# Patient Record
Sex: MALE | Race: BLACK OR AFRICAN AMERICAN | Employment: PART TIME | URBAN - METROPOLITAN AREA
[De-identification: names, ages, dates, MRNs, and addresses within clinical notes are randomized per-mention and may not be internally consistent; named-entity substitution may affect disease eponyms.]

---
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
## 2019-04-12 ENCOUNTER — HOSPITAL ENCOUNTER (EMERGENCY)
Age: 19
Discharge: HOME | End: 2019-04-12
Attending: EMERGENCY MEDICINE
Payer: COMMERCIAL

## 2019-04-12 VITALS
TEMPERATURE: 97.3 F | OXYGEN SATURATION: 99 % | RESPIRATION RATE: 18 BRPM | SYSTOLIC BLOOD PRESSURE: 123 MMHG | DIASTOLIC BLOOD PRESSURE: 69 MMHG | HEART RATE: 69 BPM

## 2019-04-12 DIAGNOSIS — S05.01XA ABRASION OF RIGHT CORNEA, INITIAL ENCOUNTER: ICD-10-CM

## 2019-04-12 DIAGNOSIS — T26.91XA CHEMICAL INJURY OF EYE, RIGHT, INITIAL ENCOUNTER: Primary | ICD-10-CM

## 2019-04-12 PROCEDURE — 6370000000 HC RX 637 (ALT 250 FOR IP): Performed by: STUDENT IN AN ORGANIZED HEALTH CARE EDUCATION/TRAINING PROGRAM

## 2019-04-12 PROCEDURE — 99283 EMERGENCY DEPT VISIT LOW MDM: CPT

## 2019-04-12 RX ORDER — PROPARACAINE HYDROCHLORIDE 5 MG/ML
1 SOLUTION/ DROPS OPHTHALMIC ONCE
Status: COMPLETED | OUTPATIENT
Start: 2019-04-12 | End: 2019-04-12

## 2019-04-12 RX ORDER — ERYTHROMYCIN 5 MG/G
OINTMENT OPHTHALMIC
Qty: 1 TUBE | Refills: 0 | Status: SHIPPED | OUTPATIENT
Start: 2019-04-12 | End: 2019-04-22

## 2019-04-12 RX ORDER — ERYTHROMYCIN 5 MG/G
OINTMENT OPHTHALMIC ONCE
Status: COMPLETED | OUTPATIENT
Start: 2019-04-12 | End: 2019-04-12

## 2019-04-12 RX ADMIN — FLUORESCEIN SODIUM 1 MG: 1 STRIP OPHTHALMIC at 04:15

## 2019-04-12 RX ADMIN — PROPARACAINE HYDROCHLORIDE 1 DROP: 5 SOLUTION/ DROPS OPHTHALMIC at 04:15

## 2019-04-12 RX ADMIN — ERYTHROMYCIN: 5 OINTMENT OPHTHALMIC at 04:49

## 2019-04-12 ASSESSMENT — ENCOUNTER SYMPTOMS
SHORTNESS OF BREATH: 0
EYE PAIN: 1
ABDOMINAL PAIN: 0
VOMITING: 0
NAUSEA: 0

## 2019-04-12 NOTE — ED NOTES
Pt to ED with c/o right eye irritation, states he was at work when he splashed chemical  in his eye. Pt states he then rinsed his eye prior to arrival. Pt denies any pain or vision problems, instructed to rinse eye in eye wash station for 10 minutes. Pt triaged VSS.      Deanne Subramanian RN  04/12/19 9943

## 2019-04-12 NOTE — ED PROVIDER NOTES
[facility] ED  Emergency Department Encounter  Emergency Medicine Resident     Pt Name: Terri Morfin  MRN: [id number]  [place] 2000  Date ofevaluation: 4/12/19  PCP:  Mary Lou Zabala MD    CHIEF COMPLAINT       Chief Complaint   Patient presents with    Eye Problem     pt splashed  in his right eye while at work      HISTORY OF PRESENT ILLNESS  (Location/Symptom, Timing/Onset, Context/Setting, Quality, Duration, Modifying Factors, Severity.)      Patient examined by myself and medical student. What follows is the medical student's HPI with my pertinent additions and subtractions. Terri Morfin is a 19 y.o. male who presents with complaint of right eye irritation after \"greaselift RTU\" cleaning product splashed in his eye while at work 30 minutes prior. He irrigated his face and eyes thoroughly at work and was then driven here and irrigated his eye further. Reports mild irritation and burning sensation when he blinks. Denies pain with eye movement, denies vision changes. Denies increased light sensitivity. Denies shortness of breath, throat pain, chest pain. Denies any other medical problems or daily medications. Does not wear contacts. REVIEW OF SYSTEMS    (2-9 systems for level 4, 10 or more for level 5)      Review of Systems   Constitutional: Negative for chills and fever. Eyes: Positive for pain. Respiratory: Negative for shortness of breath. Cardiovascular: Negative for chest pain. Gastrointestinal: Negative for abdominal pain, nausea and vomiting. Musculoskeletal: Negative for gait problem. Skin: Negative for rash. Neurological: Negative for headaches. Psychiatric/Behavioral: Negative for confusion. PAST MEDICAL / SURGICAL /SOCIAL / FAMILY HISTORY      has a past medical history of ADHD (attention deficit hyperactivity disorder). has a past surgical history that includes Ankle Closed Reduction (10/30/13).       Social strip 1 mg    proparacaine (ALCAINE) 0.5 % ophthalmic solution 1 drop    erythromycin (ROMYCIN) ophthalmic ointment    erythromycin (ROMYCIN) 5 MG/GM ophthalmic ointment     Sig: Apply 4 drops to right eye for 5 days     Dispense:  1 Tube     Refill:  0       DIAGNOSTIC RESULTS     LABS:  No results found for this visit on 04/12/19. Labs Reviewed - No data to display    ED BEDSIDE ULTRASOUND:   Not indicated    [address] [facility]   20 y/o M here after chemical injury to eye, accidentally splashed [place] greaselift RTU in his R eye; irrigated eye copiously at work and instructed to irrigate eye here upon arrival. Reports mild irritation to the eye at this time. No vision changes. On exam patient has mild scleral erythema, noted to have increased fluorescein uptake at the 3 oclock position consistent w/ corneal abrasion. No concerns for globe rupture. MSDS for the chemical was looked up, recommending copious irrigation in cases of eye contact which was done. Will provide patient with romycin ointment here and send home with the tube. Will provide with a Rx. Patient seen by occupational health, to follow up with them for reevaluation. Discussed return precautions. Patient discharged in stable condition. PROCEDURES:  Procedures    CONSULTS:  None    CRITICAL CARE:  See attending note    FINAL IMPRESSION      1. Chemical injury of eye, right, initial encounter    2. Abrasion of right cornea, initial encounter        DISPOSITION / PLAN     DISPOSITION Decision To Discharge 04/12/2019 05:03:59 AM    If discharged, the patient was instructed to return to the emergency department with any worsening symptoms, if new symptoms arise, or if they have any other concerns. Patient was instructed not to drive home if discharged today and received pain medications or other mind-altering medications while here.     PATIENT REFERRED TO:  Taye Dawson, [id number]  [phone]    Schedule an appointment as soon as possible for a visit       [facility] ED  [address] [facility] [address]  [phone]    As needed, If symptoms worsen    Occupational health    Go to         DISCHARGE MEDICATIONS:  Discharge Medication List as of 4/12/2019  4:52 AM      START taking these medications    Details   erythromycin (ROMYCIN) 5 MG/GM ophthalmic ointment Apply 4 drops to right eye for 5 days, Disp-1 Tube, R-0, Print           Hien Magallanes, DO  Emergency Medicine Resident    (Please note that portions of this note were completed with a voice recognition program.  Ran Lies made to edit the dictations but occasionally words are mis-transcribed.)      Hien Magallanes, DO  04/12/19 [id number]

## 2019-04-12 NOTE — ED PROVIDER NOTES
[address] [facility]     Emergency Department     Faculty Attestation    I performed a history and physical examination of the patient and discussed management with the resident. I reviewed the residents note and agree with the documented findings including all diagnostic interpretations and plan of care. Any areas of disagreement are noted on the chart. I was personally present for the key portions of any procedures. I have documented in the chart those procedures where I was not present during the key portions. I have reviewed the emergency nurses triage note. I agree with the chief complaint, past medical history, past surgical history, allergies, medications, social and family history as documented unless otherwise noted below. Documentation of the HPI, Physical Exam and Medical Decision Making performed by scribdoris is based on my personal performance of the HPI, PE and MDM. For Physician Assistant/ Nurse Practitioner cases/documentation I have personally evaluated this patient and have completed at least one if not all key elements of the E/M (history, physical exam, and MDM). Additional findings are as noted. Primary Care Physician: Cornel Sandifer, MD    History: This is a 19 y.o. male who presents to the Emergency Department with complaint of right eye irritation. The patient reports that just prior to arrival he was at work and was using [address] when it accidentally splashed into his right eye. His eye was copiously irrigated at his job and then he was brought to the emergency department where it was irrigated again. The patient currently complains of mild irritation to the right eye and denies any vision changes. He does not wear any corrective lenses or glasses. The patient has not taken any medications for pain and does not list any provoking or palliating factors. He denies previous history of eye injury or surgery.   The patient denies fever, chills, headache, vision changes, neck pain, back pain, chest pain, shortness of breath, abdominal pain, urinary/bowel symptoms, focal weakness, numbness, tingling, recent injury or illness. Physical:     oral temperature is 97.3 °F (36.3 °C). His blood pressure is 123/69 and his pulse is 69. His respiration is 18 and oxygen saturation is 99%.    19 y.o. male heart regular rate and rhythm, lungs clear to auscultation, abdomen soft and nontender, PERRLA, EOMI, normal funduscopic exam with normal cup-to-disc ratio, slit-lamp exam and fluorescein staining done by the resident shows fluorescein uptake at the 3:00 position of the right eye consistent with corneal abrasion, no signs of globe rupture    Impression: Normal visual acuity. The patient's symptoms are likely secondary to irritation from chemical exposure as well as corneal abrasion. He was started on erythromycin ophthalmic ointment. Low suspicion for globe rupture, corneal ulcer, or vision threatening injury.     Plan: Erythromycin ophthalmic ointment, follow-up with occupational health in 1-2 days      Ollie Burgess DO  Attending Emergency Physician         Ollie Burgess DO  04/12/19 [id number]

## 2019-04-12 NOTE — ED NOTES
HPI: 20 yo male presents with complaint of right eye irritation after \"greaselift RTU\" cleaning product splashed in it at work 30 minutes prior. He washed his face and eyes thoroughly at work and was then driven here and irrigated his eye further. Reports mild irritation and burning sensation when he blinks. Denies pain with eye movement. Denies increased light sensitivity. Denies shortness of breath, throat pain, chest pain. Denies any other medical problems or daily medications.